# Patient Record
Sex: FEMALE | Race: WHITE | NOT HISPANIC OR LATINO
[De-identification: names, ages, dates, MRNs, and addresses within clinical notes are randomized per-mention and may not be internally consistent; named-entity substitution may affect disease eponyms.]

---

## 2017-08-16 ENCOUNTER — CHARTING TRANS (OUTPATIENT)
Dept: OTHER | Age: 57
End: 2017-08-16

## 2018-11-03 VITALS
DIASTOLIC BLOOD PRESSURE: 78 MMHG | HEIGHT: 59 IN | WEIGHT: 176 LBS | BODY MASS INDEX: 35.48 KG/M2 | SYSTOLIC BLOOD PRESSURE: 128 MMHG

## 2019-04-20 ENCOUNTER — WALK IN (OUTPATIENT)
Dept: URGENT CARE | Age: 59
End: 2019-04-20

## 2019-04-20 DIAGNOSIS — B34.9 VIRAL SYNDROME: Primary | ICD-10-CM

## 2019-04-20 PROCEDURE — 99213 OFFICE O/P EST LOW 20 MIN: CPT | Performed by: NURSE PRACTITIONER

## 2019-04-20 RX ORDER — LISINOPRIL AND HYDROCHLOROTHIAZIDE 12.5; 1 MG/1; MG/1
1 TABLET ORAL DAILY
COMMUNITY

## 2019-04-20 SDOH — HEALTH STABILITY: MENTAL HEALTH: HOW OFTEN DO YOU HAVE A DRINK CONTAINING ALCOHOL?: NEVER

## 2019-04-20 ASSESSMENT — PAIN SCALES - GENERAL: PAINLEVEL: 5-6

## 2019-04-21 ASSESSMENT — ENCOUNTER SYMPTOMS
RHINORRHEA: 1
COUGH: 1
EYES NEGATIVE: 1
CHOKING: 0
SORE THROAT: 1

## 2019-07-21 ENCOUNTER — OFFICE VISIT (OUTPATIENT)
Dept: FAMILY MEDICINE CLINIC | Facility: CLINIC | Age: 59
End: 2019-07-21
Payer: COMMERCIAL

## 2019-07-21 VITALS
SYSTOLIC BLOOD PRESSURE: 146 MMHG | DIASTOLIC BLOOD PRESSURE: 92 MMHG | TEMPERATURE: 99 F | OXYGEN SATURATION: 99 % | WEIGHT: 178 LBS | RESPIRATION RATE: 16 BRPM | HEART RATE: 70 BPM | HEIGHT: 59 IN | BODY MASS INDEX: 35.88 KG/M2

## 2019-07-21 DIAGNOSIS — K21.9 GASTROESOPHAGEAL REFLUX DISEASE WITHOUT ESOPHAGITIS: Primary | ICD-10-CM

## 2019-07-21 DIAGNOSIS — Z91.09 ENVIRONMENTAL ALLERGIES: ICD-10-CM

## 2019-07-21 DIAGNOSIS — R03.0 ELEVATED BLOOD PRESSURE READING: ICD-10-CM

## 2019-07-21 DIAGNOSIS — R10.10 UPPER ABDOMINAL PAIN: ICD-10-CM

## 2019-07-21 PROCEDURE — 99213 OFFICE O/P EST LOW 20 MIN: CPT | Performed by: PHYSICIAN ASSISTANT

## 2019-07-21 RX ORDER — RANITIDINE 150 MG/1
150 TABLET ORAL 2 TIMES DAILY
Qty: 20 TABLET | Refills: 0 | Status: SHIPPED | OUTPATIENT
Start: 2019-07-21 | End: 2019-07-31

## 2019-07-21 NOTE — PATIENT INSTRUCTIONS
Start medication twice daily   Blood pressure elevated in office.  Monitor at home   Close follow up with PCP    Diet discussed   ED for any worsening abdominal pain/vomiting     GERD (Adult)    The esophagus is a tube that carries food from the mouth to th overweight, losing weight will reduce symptoms.   · Don't wear tight clothing around your stomach area.   · If your symptoms occur during sleep, use a foam wedge to elevate your upper body (not just your head.) Or, place 4\" blocks under the head of your be information is not intended as a substitute for professional medical care. Always follow your healthcare professional's instructions.

## 2019-07-21 NOTE — PROGRESS NOTES
CHIEF COMPLAINT:   Patient presents with:  Abdominal Pain: Abdominal burning sensation after meals X 2 weeks. Patient denies any NV fever or chills.    hx GERD        HPI:   Najma Hernandez is a 59year old female who presents for complaints of abdominal bur GI:See HPI  : denies urinary complaints  NEURO: denies loss of bowel or bladder control    EXAM:   BP (!) 146/92   Pulse 70   Temp 98.5 °F (36.9 °C) (Oral)   Resp 16   Ht 59\"   Wt 178 lb   SpO2 99%   BMI 35.95 kg/m²   GENERAL: well developed, well dejah Side effects, risks, benefits, of medication explained and discussed. The patient indicates understanding of these issues and agrees to the plan. Patient Instructions   Start medication twice daily   Blood pressure elevated in office.  Monitor at home · Don’t eat large meals, especially at night. Frequent, smaller meals are best. Don't lie down right after eating. And don’t eat anything 3 hours before going to bed. · Don't drink alcohol or smoke. As much as possible, stay away from second hand smoke. · Blood in the stool or vomit (red or black in color)  · Feeling weak or dizzy  · Fever of 100.4ºF (38ºC) or higher, or as directed by your healthcare provider  Date Last Reviewed: 3/1/2018  © 4413-8248 [employer] [address]  [address]

## 2021-05-26 VITALS
RESPIRATION RATE: 16 BRPM | WEIGHT: 157.74 LBS | DIASTOLIC BLOOD PRESSURE: 80 MMHG | HEIGHT: 59 IN | SYSTOLIC BLOOD PRESSURE: 120 MMHG | BODY MASS INDEX: 31.8 KG/M2 | HEART RATE: 76 BPM | TEMPERATURE: 98.9 F

## (undated) NOTE — LETTER
Date: 7/21/2019    Patient Name: Reta Clark          To Whom it may concern: This letter has been written at the patient's request. The above patient was seen at the [facility] for treatment of a medical condition.     This patient should